# Patient Record
Sex: FEMALE | URBAN - METROPOLITAN AREA
[De-identification: names, ages, dates, MRNs, and addresses within clinical notes are randomized per-mention and may not be internally consistent; named-entity substitution may affect disease eponyms.]

---
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
## 2024-05-28 ENCOUNTER — OFFICE VISIT (OUTPATIENT)
Facility: LOCATION | Age: 19
End: 2024-05-28

## 2024-05-28 DIAGNOSIS — J30.89 NON-SEASONAL ALLERGIC RHINITIS, UNSPECIFIED TRIGGER: Primary | ICD-10-CM

## 2024-05-28 PROCEDURE — 99204 OFFICE O/P NEW MOD 45 MIN: CPT | Performed by: OTOLARYNGOLOGY

## 2024-05-28 NOTE — PROGRESS NOTES
Cortney Whitney is a 18 year old female. No chief complaint on file.    HPI:   18-year-old white female is in college her mother feels that she has difficulty breathing through her nose she herself notices this to a minimum degree does not seem to be seasonal.  She has normal sense of smell gets no sinus infections does not feel that she snores at night.  Still has her tonsils.  Only occasionally gets nasal obstruction.  Current Outpatient Medications   Medication Sig Dispense Refill    Levonorgestrel-Ethinyl Estrad (AVIANE) 0.1-20 MG-MCG Oral Tab Take 1 tablet by mouth daily. 84 tablet 3      History reviewed. No pertinent past medical history.   Social History:  Social History     Socioeconomic History    Marital status: Single   Tobacco Use    Smoking status: Never    Smokeless tobacco: Never   Vaping Use    Vaping status: Never Used   Substance and Sexual Activity    Alcohol use: Never    Drug use: Never    Sexual activity: Never   Other Topics Concern    Caffeine Concern Yes     Comment: 1 cup of coffee today    Exercise No    Seat Belt Yes      History reviewed. No pertinent surgical history.      REVIEW OF SYSTEMS:   GENERAL HEALTH: feels well otherwise  GENERAL : denies fever, chills, sweats, weight loss, weight gain  SKIN: denies any unusual skin lesions or rashes  RESPIRATORY: denies shortness of breath with exertion  NEURO: denies headaches    EXAM:   LMP 08/01/2023 (Exact Date)     System Pertinent findings Details   Constitutional  Overall appearance - Normal.   Psychiatric  Orientation - Oriented to time, place, person & situation. Appropriate mood and affect.   Head/Face  Facial features -- Normal. Skull - Normal.   Eyes  Pupils equal ,round ,react to light and accomidate   Ears  External Ear Right: Normal, Left: Normal. Canal - Right: Normal, Left: Normal. TM - Right: Normal left: Normal   Nose  External Nose, Normal, Septum -slight deviation to the left nasal Vault, clear no polyps,Turbinates -  Right: Hypertrophic left: Normal   Mouth/Throat  Lips/teeth/gums - Normal. Tonsils -1+. Oropharynx - Normal.   Neck Exam  Inspection - Normal. Palpation - Normal. Parotid gland - Normal. Thyroid gland -normal   Neurological  Cranial nerves - Cranial nerves II through XII grossly intact.   Nasopharynx   Normal        Lymph Detail  Submental. Submandibular. Anterior cervical. Posterior cervical. Supraclavicular.       ASSESSMENT AND PLAN:   1. Non-seasonal allergic rhinitis, unspecified trigger  Patient has mild allergic rhinitis would recommend periodic use of over-the-counter antihistamine such as Claritin or Zyrtec.  Follow-up as needed no intervention at this time is necessary.      The patient indicates understanding of these issues and agrees to the plan.      Darryn Snowden MD  5/28/2024  11:53 AM

## 2024-06-11 ENCOUNTER — TELEPHONE (OUTPATIENT)
Dept: FAMILY MEDICINE CLINIC | Facility: CLINIC | Age: 19
End: 2024-06-11

## 2024-06-11 NOTE — TELEPHONE ENCOUNTER
Patient mother would like to have patient seen before August 10 for medication check. Please advise.

## 2024-06-12 RX ORDER — LEVONORGESTREL/ETHIN.ESTRADIOL 0.1-0.02MG
1 TABLET ORAL DAILY
Qty: 30 TABLET | Refills: 0 | Status: SHIPPED | OUTPATIENT
Start: 2024-06-12

## 2024-06-12 NOTE — TELEPHONE ENCOUNTER
Last Refill:  Medication Quantity Refills Start End   Levonorgestrel-Ethinyl Estrad (AVIANE) 0.1-20 MG-MCG Oral Tab 84 tablet 3 8/2/2023 --   Sig:   Take 1 tablet by mouth daily.         Last Office Visit: 8/23/2023 Physical  Next Office Visit: 7/8/2023 medication check     Please see pended order(s) and sign if appropriate. Thank you.

## 2024-06-12 NOTE — TELEPHONE ENCOUNTER
Called patient's Mom back and informed her patient will need to call her pharmacy for a refill.  Also informed Mom that she is not in the patient's HIPAA form and will need to have patient update her HIPAA form at her next office visit for our office to discuss info with her.  Patient's Mom voiced understanding and agreed will make sure patient will update her HIPAA form.

## 2024-06-12 NOTE — TELEPHONE ENCOUNTER
Med check scheduled for 7/8/24.     Pt mother asking for refill of:  Levonorgestrel-Ethinyl Estrad (AVIANE) 0.1-20 MG-MCG Oral Tab    TO:  [id number] [place], [place] - [address] [place] [address], [phone], [phone]    Please process if appropriate, thank you.

## 2024-06-12 NOTE — TELEPHONE ENCOUNTER
Rx refill   Levonorgestrel-Ethinyl Estrad (AVIANE) 0.1-20 MG-MCG Oral Tab     Send to   [id number] - [place], [place] - [address], [phone], [phone]

## 2024-07-08 ENCOUNTER — OFFICE VISIT (OUTPATIENT)
Dept: FAMILY MEDICINE CLINIC | Facility: CLINIC | Age: 19
End: 2024-07-08
Payer: COMMERCIAL

## 2024-07-08 VITALS
SYSTOLIC BLOOD PRESSURE: 98 MMHG | WEIGHT: 148 LBS | TEMPERATURE: 97 F | HEART RATE: 96 BPM | BODY MASS INDEX: 22.43 KG/M2 | HEIGHT: 68 IN | DIASTOLIC BLOOD PRESSURE: 58 MMHG | RESPIRATION RATE: 18 BRPM

## 2024-07-08 DIAGNOSIS — Z00.00 PHYSICAL EXAM, ANNUAL: Primary | ICD-10-CM

## 2024-07-08 DIAGNOSIS — Z00.00 LABORATORY EXAMINATION ORDERED AS PART OF A ROUTINE GENERAL MEDICAL EXAMINATION: ICD-10-CM

## 2024-07-08 RX ORDER — LEVONORGESTREL/ETHIN.ESTRADIOL 0.1-0.02MG
1 TABLET ORAL DAILY
Qty: 90 TABLET | Refills: 3 | Status: SHIPPED | OUTPATIENT
Start: 2024-07-08

## 2024-07-09 RX ORDER — TIMOLOL MALEATE 5 MG/ML
1 SOLUTION/ DROPS OPHTHALMIC DAILY
Qty: 28 TABLET | Refills: 0 | OUTPATIENT
Start: 2024-07-09

## 2024-07-09 NOTE — PROGRESS NOTES
Cortney Whitney is a 18 year old female with history of irregular menses who presents for an annual wellness physical.  Patient has irregular menses.  On birth control pills.  Doing well with medication needs refill.  Patient is in college in [place].  Working the summer.  Current Outpatient Medications   Medication Sig Dispense Refill    Levonorgestrel-Ethinyl Estrad (AVIANE) 0.1-20 MG-MCG Oral Tab Take 1 tablet by mouth daily. 90 tablet 3       PAST MEDICAL HISTORY: Denies any history of asthma or allergies. No hx of hospitalization or surgery.     FAMILY HISTORY: Mother and father are generally healthy.      REVIEW OF SYSTEMS:  GENERAL: feels well otherwise  SKIN: denies any unusual skin lesions  HEENT no congestion no runny nose no sore throat  Neck no neck pain  LUNGS: denies shortness of breath with exertion  CARDIOVASCULAR: denies chest pain on exertion  GI: denies abdominal pain and denies heartburn  MUSCULOSKELETAL: denies back pain or any significant joint pains  NEURO: denies headaches    EXAM:  BP 98/58 (BP Location: Left arm, Patient Position: Sitting, Cuff Size: adult)   Pulse 96   Temp 97.3 °F (36.3 °C) (Temporal)   Resp 18   Ht 5' 8\" (1.727 m)   Wt 148 lb (67.1 kg)   LMP 08/01/2023 (Exact Date)   BMI 22.50 kg/m²   GENERAL: well developed, well nourished and in no apparent distress  SKIN: no rashes and no suspicious lesions  HEENT: atraumatic, normocephalic and ears and throat are clear  EYES: PERRLA, EOMI, conjunctiva are clear  NECK: supple, no adenopathy   CHEST: no chest tenderness or masses  LUNGS: clear to auscultation  CARDIO: RRR without murmur  GI: good BS's and no masses, HSM or tenderness  : Deferred  MUSCULOSKELETAL: back is not tender and FROM of the back, no evidence of scoliosis  EXTREMITIES: no deformity, no swelling  NEURO: Oriented times three, cranial nerves are intact and motor and sensory are grossly intact    ASSESSMENT AND PLAN:  Cortney Whitney is a 18 year  old female  with a hx of irregular periods who presents for an annual wellness physical.   Pt is in good general health. The following issues discussed with patient: Smoking avoidance, Seat belt use and helmet use.  Return for blood work.  Patient will  continue birth control pills.  Discussed side effects of the medication she voiced understanding.  Healthy diet.  Regular activity.

## 2024-07-15 RX ORDER — TIMOLOL MALEATE 5 MG/ML
1 SOLUTION/ DROPS OPHTHALMIC DAILY
Qty: 28 TABLET | Refills: 1 | Status: SHIPPED | OUTPATIENT
Start: 2024-07-15

## 2024-10-23 NOTE — TELEPHONE ENCOUNTER
Pt would like refill of Levonorgestrel-Ethinyl Estrad (VIENVA) 0.1-20 MG-MCG Oral Tab sent to [id number] [place], [place] - [address], [phone], [phone] [[id number]] thank you.

## 2024-10-24 RX ORDER — LEVONORGESTREL/ETHIN.ESTRADIOL 0.1-0.02MG
1 TABLET ORAL DAILY
Qty: 84 TABLET | Refills: 0 | Status: SHIPPED | OUTPATIENT
Start: 2024-10-24

## 2024-10-24 NOTE — TELEPHONE ENCOUNTER
Last Office Visit: 07/08/2024    Last Refill:   Medication Quantity Refills Start End   Levonorgestrel-Ethinyl Estrad (VIENVA) 0.1-20 MG-MCG Oral Tab 28 tablet 1 7/15/2024 --     Return to Clinic: 07/08/2025    Protocol: passed

## 2025-03-24 ENCOUNTER — TELEPHONE (OUTPATIENT)
Dept: FAMILY MEDICINE CLINIC | Facility: CLINIC | Age: 20
End: 2025-03-24

## 2025-03-24 RX ORDER — LEVONORGESTREL/ETHIN.ESTRADIOL 0.1-0.02MG
1 TABLET ORAL DAILY
Qty: 84 TABLET | Refills: 0 | Status: SHIPPED | OUTPATIENT
Start: 2025-03-24

## 2025-03-24 NOTE — TELEPHONE ENCOUNTER
Patient would like 2 month refill on     Levonorgestrel-Ethinyl Estrad (VIENVA) 0.1-20 MG-MCG Oral Tab     Sent to   [id number], [place] - [address], [phone], [phone]    Refill given and follow up scheduled with [facility]. Lab visit scheduled.

## 2025-03-24 NOTE — TELEPHONE ENCOUNTER
LOV: 7/8/24     NOV: None scheduled    Requesting refill: Levonorgestrel-Ethinyl Estrad (VIENVA) 0.1-20 MG-MCG     Last refill: 10/24/24 #84       Please see pended Rx for your approval. Thank you.

## 2025-03-24 NOTE — TELEPHONE ENCOUNTER
Attempted to call patient to inform that prescription has been sent, and to schedule appointment for the summer.     No answer. Left detailed message stating to call back for appointment.

## 2025-05-27 RX ORDER — LEVONORGESTREL/ETHIN.ESTRADIOL 0.1-0.02MG
1 TABLET ORAL DAILY
Qty: 28 TABLET | Refills: 0 | Status: SHIPPED | OUTPATIENT
Start: 2025-05-27

## 2025-05-27 NOTE — TELEPHONE ENCOUNTER
Levonorgestrel-Ethinyl Estrad (VIENVA) 0.1-20 MG-MCG Oral Tab   Asking for a refill for but for this month only she is asking if it can be called to [facility] in [place]/in patients registration

## 2025-06-27 NOTE — TELEPHONE ENCOUNTER
Last Office Visit: 07/08/2024    Last Refill:   Medication Quantity Refills Start End   Levonorgestrel-Ethinyl Estrad (VIENVA) 0.1-20 MG-MCG Oral Tab 28 tablet 0 5/27/2025 --     Return to Clinic: 07/08/2025    Protocol: passed    Please call patient to schedule annual physical then route to clinical basket.

## 2025-06-30 RX ORDER — LEVONORGESTREL/ETHIN.ESTRADIOL 0.1-0.02MG
1 TABLET ORAL DAILY
Qty: 28 TABLET | Refills: 0 | Status: SHIPPED | OUTPATIENT
Start: 2025-06-30

## 2025-06-30 NOTE — TELEPHONE ENCOUNTER
Spoke to mom, she states patient has enrolled for summer classes outside of [facility] [employer]. Mom is requesting a 30d refill and will have patient complete physical with another provider in the meantime. Mom states patient will return in December. Please advise.

## 2025-08-27 ENCOUNTER — TELEPHONE (OUTPATIENT)
Dept: FAMILY MEDICINE CLINIC | Facility: CLINIC | Age: 20
End: 2025-08-27

## (undated) NOTE — LETTER
VACCINE ADMINISTRATION RECORD  PARENT / GUARDIAN APPROVAL  Date: 2022  Vaccine administered to: May Camacho     : 9/3/2005    MRN: [id number]    A copy of the appropriate Centers for Disease Control and Prevention Vaccine Information statement has been provided. I have read or have had explained the information about the diseases and the vaccines listed below. There was an opportunity to ask questions and any questions were answered satisfactorily. I believe that I understand the benefits and risks of the vaccine cited and ask that the vaccine(s) listed below be given to me or to the person named above (for whom I am authorized to make this request). VACCINES ADMINISTERED:  Menactra    I have read and hereby agree to be bound by the terms of this agreement as stated above. My signature is valid until revoked by me in writing. This document is signed by Hawa Guillory , relationship: Mother on 2022.:                                                                                                                                         Parent / Dasie Formosa                                                Date    Concepcion West MA served as a witness to authentication that the identity of the person signing electronically is in fact the person represented as signing. This document was generated by Concepcion West MA on 2022.